# Patient Record
Sex: MALE | Race: OTHER | Employment: FULL TIME | URBAN - METROPOLITAN AREA
[De-identification: names, ages, dates, MRNs, and addresses within clinical notes are randomized per-mention and may not be internally consistent; named-entity substitution may affect disease eponyms.]

---
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
## 2020-06-08 ENCOUNTER — APPOINTMENT (OUTPATIENT)
Dept: GENERAL RADIOLOGY | Facility: HOSPITAL | Age: 20
End: 2020-06-08
Attending: EMERGENCY MEDICINE

## 2020-06-08 ENCOUNTER — HOSPITAL ENCOUNTER (EMERGENCY)
Facility: HOSPITAL | Age: 20
Discharge: HOME | End: 2020-06-08
Attending: EMERGENCY MEDICINE

## 2020-06-08 VITALS
BODY MASS INDEX: 23.1 KG/M2 | HEART RATE: 89 BPM | TEMPERATURE: 98 F | DIASTOLIC BLOOD PRESSURE: 59 MMHG | SYSTOLIC BLOOD PRESSURE: 125 MMHG | HEIGHT: 71 IN | WEIGHT: 165 LBS | OXYGEN SATURATION: 98 % | RESPIRATION RATE: 16 BRPM

## 2020-06-08 DIAGNOSIS — S60.041A CONTUSION OF RIGHT RING FINGER WITHOUT DAMAGE TO NAIL, INITIAL ENCOUNTER: ICD-10-CM

## 2020-06-08 DIAGNOSIS — S60.031A CONTUSION OF RIGHT MIDDLE FINGER WITHOUT DAMAGE TO NAIL, INITIAL ENCOUNTER: Primary | ICD-10-CM

## 2020-06-08 PROCEDURE — 99283 EMERGENCY DEPT VISIT LOW MDM: CPT

## 2020-06-08 PROCEDURE — 73130 X-RAY EXAM OF HAND: CPT | Performed by: EMERGENCY MEDICINE

## 2020-06-08 RX ORDER — LORATADINE 10 MG/1
10 TABLET ORAL DAILY
COMMUNITY

## 2020-06-08 NOTE — ED INITIAL ASSESSMENT (HPI)
Crushed R 3rd/4th fingers in metal sliding door today. Abrasions around nail bed. Unknown last tetanus.

## 2020-06-08 NOTE — ED PROVIDER NOTES
Patient Seen in: [facility] Emergency Department      History   Patient presents with:  Trauma    Stated Complaint: crushed 3&4th digits on metal door today    HPI    Abdiaziz Abreu is a 53-year-old who presents for evaluation of hand injury.  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 ABDOMEN: Soft, nontender, nondistended with good bowel sounds. There is no hepatosplenomegaly and no masses.     EXTREMITIES: On examination of the right hand he has evidence of damage to the eponychial him of both h care.  They are to keep the area clean and dry. They are to apply bacitracin 2 times per day. If there is any signs of infection such as fever, swelling, increased redness or pain they are to return.             Disposition and Plan     Clinical Impressio

## (undated) NOTE — LETTER
Date & Time: 6/8/2020, 4:47 PM  Patient: Angelita Alberto  Encounter Provider(s):    Bri Whittington MD       To Whom It May Concern:    Angelita Alberto was seen and treated in our department on 6/8/2020.  He may return to work but no use of the right hand for one w